# Patient Record
Sex: MALE | Race: WHITE | Employment: OTHER | URBAN - METROPOLITAN AREA
[De-identification: names, ages, dates, MRNs, and addresses within clinical notes are randomized per-mention and may not be internally consistent; named-entity substitution may affect disease eponyms.]

---

## 2020-04-08 ENCOUNTER — APPOINTMENT (OUTPATIENT)
Dept: GENERAL RADIOLOGY | Age: 65
End: 2020-04-08
Payer: COMMERCIAL

## 2020-04-08 ENCOUNTER — HOSPITAL ENCOUNTER (EMERGENCY)
Age: 65
Discharge: HOME | End: 2020-04-08
Attending: EMERGENCY MEDICINE
Payer: COMMERCIAL

## 2020-04-08 VITALS
BODY MASS INDEX: 30.61 KG/M2 | WEIGHT: 195 LBS | SYSTOLIC BLOOD PRESSURE: 149 MMHG | OXYGEN SATURATION: 95 % | HEIGHT: 67 IN | RESPIRATION RATE: 16 BRPM | DIASTOLIC BLOOD PRESSURE: 92 MMHG | HEART RATE: 85 BPM | TEMPERATURE: 98.6 F

## 2020-04-08 PROCEDURE — 99283 EMERGENCY DEPT VISIT LOW MDM: CPT

## 2020-04-08 PROCEDURE — 73090 X-RAY EXAM OF FOREARM: CPT

## 2020-04-08 PROCEDURE — 90471 IMMUNIZATION ADMIN: CPT | Performed by: EMERGENCY MEDICINE

## 2020-04-08 PROCEDURE — 12001 RPR S/N/AX/GEN/TRNK 2.5CM/<: CPT

## 2020-04-08 PROCEDURE — 90715 TDAP VACCINE 7 YRS/> IM: CPT | Performed by: EMERGENCY MEDICINE

## 2020-04-08 PROCEDURE — 6370000000 HC RX 637 (ALT 250 FOR IP): Performed by: EMERGENCY MEDICINE

## 2020-04-08 PROCEDURE — 6360000002 HC RX W HCPCS: Performed by: EMERGENCY MEDICINE

## 2020-04-08 RX ORDER — CEPHALEXIN 250 MG/1
500 CAPSULE ORAL ONCE
Status: COMPLETED | OUTPATIENT
Start: 2020-04-08 | End: 2020-04-08

## 2020-04-08 RX ORDER — GINSENG 100 MG
CAPSULE ORAL ONCE
Status: COMPLETED | OUTPATIENT
Start: 2020-04-08 | End: 2020-04-08

## 2020-04-08 RX ORDER — CEPHALEXIN 500 MG/1
500 CAPSULE ORAL 3 TIMES DAILY
Qty: 15 CAPSULE | Refills: 0 | Status: SHIPPED | OUTPATIENT
Start: 2020-04-08 | End: 2020-04-13

## 2020-04-08 RX ADMIN — TETANUS TOXOID, REDUCED DIPHTHERIA TOXOID AND ACELLULAR PERTUSSIS VACCINE, ADSORBED 0.5 ML: 5; 2.5; 8; 8; 2.5 SUSPENSION INTRAMUSCULAR at 14:18

## 2020-04-08 RX ADMIN — BACITRACIN: 500 OINTMENT TOPICAL at 15:47

## 2020-04-08 RX ADMIN — CEPHALEXIN 500 MG: 250 CAPSULE ORAL at 15:47

## 2020-04-08 ASSESSMENT — ENCOUNTER SYMPTOMS
COLOR CHANGE: 0
ABDOMINAL PAIN: 0
VOMITING: 0
SHORTNESS OF BREATH: 0

## 2020-04-08 NOTE — ED PROVIDER NOTES
[address] [facility]  ED  EMERGENCY DEPARTMENT ENCOUNTER        Pt Name: Chucky Wilder  MRN: [id number]  [place] 1955  Date of evaluation: 4/8/2020  Provider: Srinivasa Bates MD  PCP: Nitish Barkley       Chief Complaint   Patient presents with    Laceration     pt was using a  today when he slit his left wrist. pt worried he hit an artery        HISTORY OFPRESENT ILLNESS   (Location/Symptom, Timing/Onset, Context/Setting, Quality, Duration, Modifying Factors,Severity)  Note limiting factors. Chucky Wilder is a 60 y.o. male presenting today due to concern for using a  at which point he lost a piece of material he was cutting and subsequently slipped causing his left forearm to sustain an injury on the volar aspect. He is right-handed. He is unsure of last tetanus shot. Besides for the injury to the forearm, he denies any chest pain, shortness of breath, headache, fever, cough, vomiting, other concerns for injury. He denies any concern for coronavirus. No numbness or weakness in the arms or legs. Initially he states that it was bleeding profusely but by the time he got to the emergency department it stopped. It happened just prior to arrival.        REVIEW OF SYSTEMS    (2-9 systems for level 4, 10 or more for level 5)     Review of Systems   Constitutional: Negative for chills and fever. HENT: Negative for congestion. Respiratory: Negative for shortness of breath. Cardiovascular: Negative for chest pain. Gastrointestinal: Negative for abdominal pain and vomiting. Genitourinary: Negative for flank pain. Musculoskeletal: Negative for arthralgias, gait problem, joint swelling and neck pain. Skin: Positive for wound (left forearm only). Negative for color change. Neurological: Negative for weakness, numbness and headaches. Psychiatric/Behavioral: Negative for confusion. Positives and Pertinent negatives as per HPI.       PASTMEDICAL HISTORY     Past Medical History:   Diagnosis Date    Cancer [facility])     bladder    Hypertension          SURGICAL HISTORY       Past Surgical History:   Procedure Laterality Date    BLADDER REMOVAL      COLONOSCOPY  10/21/11    tic    SHOULDER SURGERY           CURRENT MEDICATIONS       Previous Medications    BISOPROLOL-HYDROCHLOROTHIAZIDE PO    Take  by mouth. ALLERGIES     Patient has no known allergies. FAMILY HISTORY     History reviewed. No pertinent family history. SOCIAL HISTORY       Social History     Socioeconomic History    Marital status:      Spouse name: None    Number of children: None    Years of education: None    Highest education level: None   Occupational History    None   Social Needs    Financial resource strain: None    Food insecurity     Worry: None     Inability: None    Transportation needs     Medical: None     Non-medical: None   Tobacco Use    Smoking status: Never Smoker    Smokeless tobacco: Never Used   Substance and Sexual Activity    Alcohol use:  Yes     Alcohol/week: 4.0 standard drinks     Types: 4 Cans of beer per week    Drug use: None    Sexual activity: None   Lifestyle    Physical activity     Days per week: None     Minutes per session: None    Stress: None   Relationships    Social connections     Talks on phone: None     Gets together: None     Attends Zoroastrianism service: None     Active member of club or organization: None     Attends meetings of clubs or organizations: None     Relationship status: None    Intimate partner violence     Fear of current or ex partner: None     Emotionally abused: None     Physically abused: None     Forced sexual activity: None   Other Topics Concern    None   Social History Narrative    None       SCREENINGS    [place] Coma Scale  Eye Opening: Spontaneous  Best Verbal Response: Oriented  Best Motor Response: Obeys commands  [place] Coma Scale Score: 15           PHYSICAL EXAM    (up to 7 for Left hand: Normal. He exhibits normal range of motion, no tenderness, no bony tenderness, normal capillary refill, no deformity, no laceration and no swelling. Normal sensation noted. Decreased sensation is not present in the ulnar distribution, is not present in the medial redistribution and is not present in the radial distribution. Normal strength noted. He exhibits no finger abduction, no thumb/finger opposition and no wrist extension trouble. Hands:    Skin:     General: Skin is warm and dry. Capillary Refill: Capillary refill takes less than 2 seconds. Coloration: Skin is not jaundiced or pale. Findings: Signs of injury (left forearm only) and laceration (left forearm only) present. No abrasion, bruising, burn, ecchymosis, erythema, lesion or rash. Neurological:      General: No focal deficit present. Mental Status: He is alert and oriented to person, place, and time. Mental status is at baseline. GCS: GCS eye subscore is 4. GCS verbal subscore is 5. GCS motor subscore is 6. Sensory: Sensation is intact. No sensory deficit. Motor: Motor function is intact. No weakness, tremor, atrophy, abnormal muscle tone or seizure activity. Psychiatric:         Mood and Affect: Mood normal.         Speech: Speech normal.         Behavior: Behavior normal. Behavior is cooperative. DIAGNOSTIC RESULTS   :    Labs Reviewed - No data to display    All other labs were within normal range or not returned asof this dictation. EKG:  All EKG's are interpreted by the Emergency Department Physician who either signs or Co-signs this chart in the absence of a cardiologist.        RADIOLOGY:   Non-plain film images such as CT, Ultrasound and MRI are read by the radiologist. Plainradiographic images are visualized and preliminarily interpreted by the  ED Provider with the belowfindings:        Interpretation per the Radiologist below, if available at the time of this note:    XR Vitals:    04/08/20 1404 04/08/20 1406 04/08/20 1459   BP:  (!) 208/114 (!) 149/92   Pulse:  98 85   Resp:  17 16   Temp:  98.6 °F (37 °C)    TempSrc:  Oral    SpO2:  98% 95%   Weight: 195 lb (88.5 kg)     Height: 5' 7\" (1.702 m)         Patient was given the following medications:  Medications   Tetanus-Diphth-Acell Pertussis (BOOSTRIX) injection 0.5 mL (0.5 mLs Intramuscular Given 4/8/20 1418)   cephALEXin (KEFLEX) capsule 500 mg (500 mg Oral Given 4/8/20 1547)   bacitracin ointment ( Topical Given 4/8/20 1547)     Patient was evaluated due to concern for laceration related to a  injury. X-ray did not show any foreign body or other significant trauma to the left forearm. No other complaints at this time. I repaired the wound after adequate irrigation by nurse. Patient was told to have the sutures removed in 10 days. He knows to return to the ED if he develops any signs of infection, pulsatile mass, other concerning symptoms but otherwise take Keflex for the next 5 days. His brother is a vascular surgeon and he states if he has any concerns he can always follow-up there. He also states he has a hand surgeon that he can follow-up with if he notices any weakness in the hand although on my exam I did not appreciate any weakness in the fingers or wrist to suggest a tendon injury. He was well-appearing and in no acute distress at time of discharge and felt comfortable with this plan. The patient tolerated their visit well. The patient and / or the family were informed of the results of any tests, a time was given to answer questions. FINAL IMPRESSION      1. Forearm laceration, left, initial encounter    2.  Elevated blood pressure reading          DISPOSITION/PLAN   DISPOSITION  -discharged in improved, stable condition      PATIENT REFERRED TO:  [facility]  ED  [address] [facility] [address]  Go to   If symptoms worsen    Richard Garcia [address] [facility] [address]  [phone]    Call   As needed and have sutures removed in 10 days      DISCHARGEMEDICATIONS:  New Prescriptions    CEPHALEXIN (KEFLEX) 500 MG CAPSULE    Take 1 capsule by mouth 3 times daily for 5 days       DISCONTINUED MEDICATIONS:  Discontinued Medications    No medications on file              (Please note that portions of this note were completed with a voicerecognition program.  Efforts were made to edit the dictations but occasionally words are mis-transcribed.)    Keyur Harrington MD (electronically signed)            Keyur Harrington MD  04/08/20 1850

## 2020-04-08 NOTE — ED PROVIDER NOTES
As physician-in-triage, I performed a medical screening history and physical exam on this patient. HISTORY OF PRESENT ILLNESS  Fang Barahona is a 60 y.o. male who works with a . He was handling a sheet of wonderboard, which slipped. As he attempted to stabilize the board his left forearm hit the grinding wheel. He has a laceration the left forearm that was \"pumping\" prior to arrival. Last tetanus is uncertain. PHYSICAL EXAM  Ht 5' 7\" (1.702 m)   Wt 195 lb (88.5 kg)   BMI 30.54 kg/m²     On exam, the patient appears in no acute distress. Speech is clear. Breathing is unlabored. Moves all extremities. There is a 1.5 cm (approx) button-hold like laceration in the mid-distal left forearm. Excellent radial and ulnar pulses and brisk cap refill in all five nail beds. Mike's test is intact. No focal sensory or motor deficits appreciated. Comment: Please note this report has been produced using speech recognition software and may contain errors related to that system including errors in grammar, punctuation, and spelling, as well as words and phrases that may be inappropriate. If there are any questions or concerns please feel free to contact the dictating provider for clarification.       Dleia Gonzalez MD  04/08/20 9026